# Patient Record
Sex: MALE | Race: WHITE | Employment: FULL TIME | URBAN - METROPOLITAN AREA
[De-identification: names, ages, dates, MRNs, and addresses within clinical notes are randomized per-mention and may not be internally consistent; named-entity substitution may affect disease eponyms.]

---

## 2023-03-10 ENCOUNTER — HOSPITAL ENCOUNTER (EMERGENCY)
Age: 28
Discharge: HOME | End: 2023-03-10
Attending: EMERGENCY MEDICINE

## 2023-03-10 ENCOUNTER — APPOINTMENT (OUTPATIENT)
Dept: GENERAL RADIOLOGY | Age: 28
End: 2023-03-10

## 2023-03-10 VITALS
DIASTOLIC BLOOD PRESSURE: 73 MMHG | TEMPERATURE: 97.5 F | RESPIRATION RATE: 18 BRPM | WEIGHT: 15 LBS | HEART RATE: 55 BPM | OXYGEN SATURATION: 98 % | SYSTOLIC BLOOD PRESSURE: 127 MMHG

## 2023-03-10 DIAGNOSIS — M79.5 FOREIGN BODY (FB) IN SOFT TISSUE: Primary | ICD-10-CM

## 2023-03-10 PROCEDURE — 73552 X-RAY EXAM OF FEMUR 2/>: CPT

## 2023-03-10 PROCEDURE — 90471 IMMUNIZATION ADMIN: CPT | Performed by: STUDENT IN AN ORGANIZED HEALTH CARE EDUCATION/TRAINING PROGRAM

## 2023-03-10 PROCEDURE — 6360000002 HC RX W HCPCS: Performed by: STUDENT IN AN ORGANIZED HEALTH CARE EDUCATION/TRAINING PROGRAM

## 2023-03-10 PROCEDURE — 96372 THER/PROPH/DIAG INJ SC/IM: CPT

## 2023-03-10 PROCEDURE — 6370000000 HC RX 637 (ALT 250 FOR IP): Performed by: STUDENT IN AN ORGANIZED HEALTH CARE EDUCATION/TRAINING PROGRAM

## 2023-03-10 PROCEDURE — 90714 TD VACC NO PRESV 7 YRS+ IM: CPT | Performed by: STUDENT IN AN ORGANIZED HEALTH CARE EDUCATION/TRAINING PROGRAM

## 2023-03-10 PROCEDURE — 99284 EMERGENCY DEPT VISIT MOD MDM: CPT

## 2023-03-10 RX ORDER — LIDOCAINE HYDROCHLORIDE AND EPINEPHRINE 10; 10 MG/ML; UG/ML
20 INJECTION, SOLUTION INFILTRATION; PERINEURAL ONCE
Status: DISCONTINUED | OUTPATIENT
Start: 2023-03-10 | End: 2023-03-10

## 2023-03-10 RX ORDER — AMOXICILLIN AND CLAVULANATE POTASSIUM 875; 125 MG/1; MG/1
1 TABLET, FILM COATED ORAL ONCE
Status: COMPLETED | OUTPATIENT
Start: 2023-03-10 | End: 2023-03-10

## 2023-03-10 RX ORDER — KETOROLAC TROMETHAMINE 30 MG/ML
30 INJECTION, SOLUTION INTRAMUSCULAR; INTRAVENOUS ONCE
Status: COMPLETED | OUTPATIENT
Start: 2023-03-10 | End: 2023-03-10

## 2023-03-10 RX ORDER — AMOXICILLIN AND CLAVULANATE POTASSIUM 875; 125 MG/1; MG/1
1 TABLET, FILM COATED ORAL 2 TIMES DAILY
Qty: 20 TABLET | Refills: 0 | Status: SHIPPED | OUTPATIENT
Start: 2023-03-10 | End: 2023-03-20

## 2023-03-10 RX ADMIN — CLOSTRIDIUM TETANI TOXOID ANTIGEN (FORMALDEHYDE INACTIVATED) AND CORYNEBACTERIUM DIPHTHERIAE TOXOID ANTIGEN (FORMALDEHYDE INACTIVATED) 0.5 ML: 5; 2 INJECTION, SUSPENSION INTRAMUSCULAR at 22:21

## 2023-03-10 RX ADMIN — KETOROLAC TROMETHAMINE 30 MG: 30 INJECTION, SOLUTION INTRAMUSCULAR at 22:22

## 2023-03-10 RX ADMIN — AMOXICILLIN AND CLAVULANATE POTASSIUM 1 TABLET: 875; 125 TABLET ORAL at 23:40

## 2023-03-10 ASSESSMENT — LIFESTYLE VARIABLES: HOW OFTEN DO YOU HAVE A DRINK CONTAINING ALCOHOL: MONTHLY OR LESS

## 2023-03-10 ASSESSMENT — PAIN DESCRIPTION - ORIENTATION
ORIENTATION: RIGHT;UPPER
ORIENTATION: RIGHT

## 2023-03-10 ASSESSMENT — PAIN DESCRIPTION - LOCATION
LOCATION: LEG
LOCATION: LEG

## 2023-03-10 ASSESSMENT — PAIN DESCRIPTION - FREQUENCY: FREQUENCY: INTERMITTENT

## 2023-03-10 ASSESSMENT — PAIN - FUNCTIONAL ASSESSMENT
PAIN_FUNCTIONAL_ASSESSMENT: 0-10
PAIN_FUNCTIONAL_ASSESSMENT: 0-10

## 2023-03-10 ASSESSMENT — PAIN DESCRIPTION - PAIN TYPE: TYPE: ACUTE PAIN

## 2023-03-10 ASSESSMENT — PAIN SCALES - GENERAL
PAINLEVEL_OUTOF10: 3
PAINLEVEL_OUTOF10: 7

## 2023-03-11 NOTE — DISCHARGE INSTRUCTIONS
Take all antibiotics as prescribed  Keep area clean and dry  Follow-up with general surgeon  If you notice any new worrisome symptoms please return to emergency department for evaluation

## 2023-03-11 NOTE — ED PROVIDER NOTES
[address]      Pt Name: Unique Hanson  MRN: [id number]  [place] 1995  Date of evaluation: 3/10/2023  Provider: Louise Marroquin DO  PCP: No primary care provider on file. Note Started: 9:25 PM EST 3/10/23    CHIEF COMPLAINT       Chief Complaint   Patient presents with    Foreign Body in Skin     PC METAL STUCK IN RT UPPER THIGH       HISTORY OF PRESENT ILLNESS: 1 or more Elements   History From: Patient  Limitations to history : None    Unique Hanson is a 33 y.o. male No significant past medical history. Patient presents with chief complaint of right eye pain with concern for possible foreign body. Patient states that he was working with metal earlier today when he was hammering a piece of metal and felt a piece sheared off and embedded in his right thigh. Patient notes pain to his right thigh described as a burning sensation currently rates pain 7 out of 10. Patient denies any exacerbating relieving factors. States that symptoms have been moderate in severity constant since onset. Patient states the pain is worsened with walking he denies any relieving factors. Patient denies any similar episodes in the past.  Patient cannot recall his last tetanus. Denies any nausea, vomiting, chest pain, cough, abdominal pain, constipation or diarrhea. Nursing Notes were all reviewed and agreed with or any disagreements were addressed in the HPI. REVIEW OF SYSTEMS :    Positives and Pertinent negatives as per HPI. SURGICAL HISTORY   No past surgical history on file. [address]       Discharge Medication List as of 3/10/2023 11:37 PM          ALLERGIES     Patient has no known allergies. FAMILYHISTORY     No family history on file.      SOCIAL HISTORY       Social History     Tobacco Use    Smoking status: Some Days     Types: Cigarettes       SCREENINGS        [place] Coma Scale  Eye Opening: Spontaneous  Best Verbal Response: Oriented  Best Motor Response: Obeys commands  [place] Coma Scale Score: 15                CIWA Assessment  BP: 127/73  Heart Rate: 55  Nausea and Vomiting: no nausea and no vomiting           PHYSICAL EXAM  1 or more Elements     ED Triage Vitals   BP Temp Temp Source Heart Rate Resp SpO2 Height Weight   03/10/23 1753 03/10/23 1753 03/10/23 1753 03/10/23 1753 03/10/23 1753 03/10/23 1753 -- 03/10/23 1822   132/87 97.5 °F (36.4 °C) Infrared 63 20 100 %  15 lb (6.804 kg)            Constitutional/General: Alert and oriented x3  Head: Normocephalic and atraumatic  Eyes: PERRL, EOMI, conjunctiva normal, sclera non icteric  ENT:  Oropharynx clear, handling secretions, no trismus, no asymmetry of the posterior oropharynx or uvular edema  Neck: Supple, full ROM, no stridor, no meningeal signs  Respiratory: Lungs clear to auscultation bilaterally, no wheezes, rales, or rhonchi. Not in respiratory distress  Cardiovascular:  Regular rate. Regular rhythm. No murmurs, no gallops, no rubs. 2+ distal pulses. Equal extremity pulses. Chest: No chest wall tenderness  GI:  Abdomen Soft, Non tender, Non distended. +BS. No rebound, guarding, or rigidity. No pulsatile masses. Musculoskeletal: On examination of the right thigh there is a small half centimeter wound noted to the anterior aspect of the right thigh no palpable foreign body identified. Compartment soft compressible right lower extremity is neurovascular intact. Integument: skin warm and dry. No rashes. Neurologic: GCS 15, no focal deficits, symmetric strength 5/5 in the upper and lower extremities bilaterally  Psychiatric: Normal Affect    DIAGNOSTIC RESULTS   LABS:    Labs Reviewed - No data to display    As interpreted by me, the above displayed labs are abnormal. All other labs obtained during this visit were within normal range or not returned as of this dictation.       RADIOLOGY:   Non-plain film images such as CT, Ultrasound and MRI are read by the radiologist. Plain radiographic images are visualized and preliminarily interpreted by the ED Provider with the below findings:  Right femur x-ray: Retained metallic foreign body    Interpretation per the Radiologist below, if available at the time of this note:    XR FEMUR RIGHT (MIN 2 VIEWS)   Final Result   1. A linear crescentic metallic density measuring up to 10 mm is present in   the medial upper thigh soft tissues. 2. No fracture or osseous lesion. RECOMMENDATION:   Careful clinical correlation and follow up recommended. No results found. No results found. PROCEDURES     PROCEDURE  3/10/23       Time: 2310    FOREIGN BODY REMOVAL  Risks, benefits and alternatives (for applicable procedures below) described. Performed By: Terence Galaviz DO. Indication:  Foreign body under the skin. Location:   right thigh . Informed consent obtained: The patient provided verbal consent for this procedure. .  Prep: The skin was cleansed with povidone iodine and draped in a sterile fashion. Anesthetic: The wound area was anesthetized with Lidocaine 1% without epinephrine. Foreign Body was: unable to be removed and a surgical referral was given. Ultrasound Guidance:   used. Complications:  None. Patient tolerated the procedure well. PAST MEDICAL HISTORY/Chronic Conditions Affecting Care    has no past medical history on file.      EMERGENCY DEPARTMENT COURSE    Vitals:    Vitals:    03/10/23 1753 03/10/23 1822 03/10/23 2238   BP: 132/87 129/81 127/73   Pulse: 63  55   Resp: 20 18 18   Temp: 97.5 °F (36.4 °C)     TempSrc: Infrared     SpO2: 100%  98%   Weight:  15 lb (6.804 kg)        Patient was given the following medications:  Medications   tetanus & diphtheria toxoids (adult) 5-2 LFU injection 0.5 mL (0.5 mLs IntraMUSCular Given 3/10/23 2221)   ketorolac (TORADOL) injection 30 mg (30 mg IntraMUSCular Given 3/10/23 2222)   amoxicillin-clavulanate (AUGMENTIN) 875-125 MG per tablet 1 tablet (1 tablet Oral Given 3/10/23 5850)         Is this patient to be included in the SEP-1 Core Measure due to severe sepsis or septic shock? No Exclusion criteria - the patient is NOT to be included for SEP-1 Core Measure due to: Infection is not suspected      Medical Decision Making/Differential Diagnosis:    CC/HPI Summary, Social Determinants of health, Records Reviewed, DDx, testing done/not done, ED Course, Reassessment, disposition considerations/shared decision making with patient, consults, disposition:            Chronic Conditions: No past medical history on file. CONSULTS: (Who and What was discussed)  None    Discussion with Other Profesionals : None    Social Determinants : None    Records Reviewed : None    CC/HPI Summary, DDx, ED Course, and Reassessment:   Patient is a 50-year-old male no significant past medical history. Patient presents with chief complaint of foreign body in right thigh. Vital signs stable presentation. On physical exam heart regular rate and rhythm, lungs clear to auscultation bilaterally, abdomen soft nontender no rebound or guarding. On examination of the right thigh there is a small half centimeter wound noted to the anterior right thigh no palpable foreign body identified. Differential diagnosis includes laceration, metallic foreign body. Right femur x-rays obtained demonstrated a linear crescent metallic density approximately 10 mm in the medial upper thigh. Patient tetanus shot updated. Patient given 30 mg of IM Toradol for pain. Patient did give verbal consent for attempt at foreign body removal.  Please see above corresponding procedure note. Even with ultrasound guidance was unable to definitively identify foreign body for removal.  Decision made to leave foreign body in place and refer patient for general surgery evaluation. Patient was also covered with Augmentin 875-125 mg for infection prevention.   Finds consistent with metallic foreign body. Patient is overall well-appearing his tetanus shot was updated patient did tolerate oral antibiotics well. Decision made to discharge patient. Patient given prescription for Augmentin as well as general surgery referral.  Patient also instructed to follow-up with primary care doctor. If patient notes any new worrisome symptoms he was instructed to return to the emergency department for further evaluation. Plan of care discussed with patient including discharge all questions were answered, patient was in agreement plan of care and discharged home in stable condition. Disposition Considerations (Tests not ordered but considered, Shared Decision Making, Pt Expectation of Test or Tx.): Shared decision making discussed with patient. At this time we were unable to remove foreign body even under ultrasound guidance. Patient will be covered with antibiotics as well as given referral to general surgery for further evaluation. FINAL IMPRESSION      1.  Foreign body (FB) in soft tissue          DISPOSITION/PLAN     DISPOSITION Decision To Discharge 03/10/2023 11:24:22 PM    PATIENT REFERRED TO:  [facility]) Physicians Pre-Service  [phone]  Call   for PCP referral    [address] [facility] Emergency Department  [address] [address] [phone]  Go to   If symptoms worsen    Ronaldo Ramos MD  [facility] [address]  [address] [facility]  [address] [address] [phone]          DISCHARGE MEDICATIONS:  Discharge Medication List as of 3/10/2023 11:37 PM        START taking these medications    Details   amoxicillin-clavulanate (AUGMENTIN) 875-125 MG per tablet Take 1 tablet by mouth 2 times daily for 10 days, Disp-20 tablet, R-0Normal             DISCONTINUED MEDICATIONS:  Discharge Medication List as of 3/10/2023 11:37 PM               (Please note that portions of this note were completed with a voice recognition program.  Efforts were made to edit the dictations but occasionally words are mis-transcribed.)    Shiraz Delarosa DO (electronically signed)        Brenda Gonzalez DO  Resident  03/11/23 8417

## 2023-03-15 ENCOUNTER — INITIAL CONSULT (OUTPATIENT)
Dept: SURGERY | Age: 28
End: 2023-03-15

## 2023-03-15 VITALS
HEART RATE: 74 BPM | HEIGHT: 66 IN | BODY MASS INDEX: 24.11 KG/M2 | TEMPERATURE: 98.1 F | SYSTOLIC BLOOD PRESSURE: 131 MMHG | WEIGHT: 150 LBS | DIASTOLIC BLOOD PRESSURE: 73 MMHG

## 2023-03-15 DIAGNOSIS — S71.122A: Primary | ICD-10-CM

## 2023-03-15 PROCEDURE — 99203 OFFICE O/P NEW LOW 30 MIN: CPT | Performed by: SURGERY

## 2023-03-17 PROBLEM — S71.122A: Status: ACTIVE | Noted: 2023-03-17

## 2023-03-17 NOTE — PROGRESS NOTES
General Surgery History and Physical  [facility]    Patient's Name/Date of Birth: Magali Coronado / 1995    Date: March 17, 2023     Surgeon: Vince Vasquez MD    PCP: No primary care provider on file. Chief Complaint: Right thigh foreign body    HPI:   Magali Coronado is a 33 y.o. male who presents for evaluation of right thigh foreign body. He was working on metal equipment at home and a piece flew off and hit him in the thigh. He was seen in urgent care and was started on antibiotics. He has minimal pain at the site. There has been no fevers, chills, warmth, erythema, drainage, other signs of infection. He was referred for surgical evaluation. There is no problem list on file for this patient. Past Medical History:   Diagnosis Date    Heart murmur        No past surgical history on file. No Known Allergies    The patient has a family history that is negative for severe cardiovascular or respiratory issues, negative for reaction to anesthesia. Time spent reviewing past medical, surgical, social and family history, vitals, nursing assessment and images. No changes from above documented history.     Social History     Socioeconomic History    Marital status:      Spouse name: Not on file    Number of children: Not on file    Years of education: Not on file    Highest education level: Not on file   Occupational History    Not on file   Tobacco Use    Smoking status: Never    Smokeless tobacco: Not on file   Vaping Use    Vaping Use: Not on file   Substance and Sexual Activity    Alcohol use: Not on file    Drug use: Not on file    Sexual activity: Not on file   Other Topics Concern    Not on file   Social History Narrative    Not on file     Social Determinants of Health     Financial Resource Strain: Not on file   Food Insecurity: Not on file   Transportation Needs: Not on file   Physical Activity: Not on file   Stress: Not on file   Social Connections: Not on file   Intimate Partner Violence: Not on file   Housing Stability: Not on file       I have reviewed relevant labs from this admission and interpretation is included in my assessment and plan    Review of Systems    A complete 10 system review was performed and are otherwise negative unless mentioned in the above HPI. Specific negatives are listed below but may not include all those reviewed. General ROS: negative obtundation, AMS  ENT ROS: negative rhinorrhea, epistaxis  Allergy and Immunology ROS: negative itchy/watery eyes or nasal congestion  Hematological and Lymphatic ROS: negative spontaneous bleeding or bruising  Endocrine ROS: negative  lethargy, mood swings, palpitations or polydipsia/polyuria  Respiratory ROS: negative sputum changes, stridor, tachypnea or wheezing  Cardiovascular ROS: negative for - loss of consciousness, murmur or orthopnea  Gastrointestinal ROS: negative for - hematochezia or hematemesis  Genito-Urinary ROS: negative for -  genital discharge or hematuria  Musculoskeletal ROS: negative for - focal weakness, gangrene  Psych/Neuro ROS: negative for - visual or auditory hallucinations, suicidal ideation    Physical exam:   /73   Pulse 74   Temp 98.1 °F (36.7 °C)   Ht 5' 6\" (1.676 m)   Wt 150 lb (68 kg)   BMI 24.21 kg/m²   General appearance:  NAD, appears stated age  Head: NCAT, PERRLA, EOMI, red conjunctiva  Neck: supple, no masses, trachea midline  Lungs: Equal chest rise bilateral, no retractions, no wheezing  Heart: Reg rate  Abdomen: soft, nontender, nondistended  Skin; warm and dry, no cyanosis  Gu: no cva tenderness  Extremities: Right proximal anterior thigh with 5 mm laceration that is healing with a scab. There is no erythema. Small area of swelling believe the laceration without palpable metallic fragment.   Psych: No tremor, visual hallucinations      Radiology: I reviewed relevant abdominal imaging from this admission and that available in the EMR including x-ray of the right femur showing metallic fragment in the soft tissues    Assessment:  Magali Coronado is a 33 y.o. male with metal foreign body proximal left thigh  There is no problem list on file for this patient. Plan:  Patient has no pain at this time. He has minimal signs of infection and is currently on antibiotics  We discussed surgical intervention versus watchful waiting. We have decided to monitor this. Patient will follow-up in 4 weeks to determine if surgical intervention is warranted at this time  He was instructed to return sooner if there is any signs of infection, pain, swelling.         Vince Vasquez MD

## 2023-04-26 ENCOUNTER — OFFICE VISIT (OUTPATIENT)
Dept: SURGERY | Age: 28
End: 2023-04-26

## 2023-04-26 VITALS
WEIGHT: 150 LBS | HEART RATE: 64 BPM | SYSTOLIC BLOOD PRESSURE: 127 MMHG | HEIGHT: 66 IN | BODY MASS INDEX: 24.11 KG/M2 | TEMPERATURE: 98.9 F | RESPIRATION RATE: 18 BRPM | DIASTOLIC BLOOD PRESSURE: 80 MMHG

## 2023-04-26 DIAGNOSIS — S71.122D: Primary | ICD-10-CM

## 2023-04-26 PROCEDURE — 99213 OFFICE O/P EST LOW 20 MIN: CPT | Performed by: SURGERY

## 2023-04-26 NOTE — PROGRESS NOTES
General Surgery Office Note  [facility]  Consandre P. Julian Timmons MD    Patient's Name/Date of Birth: Vee Mcneill / 1995    Date: April 26, 2023     Surgeon: Julian Timmons MD    Chief Complaint:   Chief Complaint   Patient presents with    Follow-up     4 week follow up/eval if surgery is needed       Patient Active Problem List   Diagnosis    Laceration of left thigh with foreign body       Subjective: doing well. No issues since last visit. No pain. Objective:  /80   Pulse 64   Temp 98.9 °F (37.2 °C) (Temporal)   Resp 18   Ht 5' 6\" (1.676 m)   Wt 150 lb (68 kg)   BMI 24.21 kg/m²   Labs:  No results for input(s): WBC, HGB, HCT in the last 72 hours. Invalid input(s): PLR  No results found for: CREATININE, BUN, NA, K, CL, CO2  No results for input(s): LIPASE, AMYLASE in the last 72 hours. General appearance: AA, NAD  HEENT: NCAT, PERRLA, EOMI  Lungs: Clear, equal rise bilateral  Heart: Reg  Abdomen: soft, nondistended, nontender  Skin: R medial thigh small area of induration. No erythema. No signs of infection. Psych: No distress, conversive, no hallucinations  : No ulcers or lesions  Rectal: No bleeding    A complete 10 system review was performed and are otherwise negative unless mentioned in the above HPI. Specific negatives are listed below but may not include all those reviewed.     General ROS: negative obtundation, AMS  ENT ROS: negative rhinorrhea, epistaxis  Allergy and Immunology ROS: negative itchy/watery eyes or nasal congestion  Hematological and Lymphatic ROS: negative spontaneous bleeding or bruising  Endocrine ROS: negative  lethargy, mood swings, palpitations or polydipsia/polyuria  Respiratory ROS: negative sputum changes, stridor, tachypnea or wheezing  Cardiovascular ROS: negative for - loss of consciousness, murmur or orthopnea  Gastrointestinal ROS: negative for - hematochezia or hematemesis  Genito-Urinary ROS: negative for -  genital discharge or

## 2025-04-26 ENCOUNTER — HOSPITAL ENCOUNTER (EMERGENCY)
Age: 30
Discharge: HOME | End: 2025-04-26
Payer: COMMERCIAL

## 2025-04-26 VITALS
HEART RATE: 54 BPM | OXYGEN SATURATION: 100 % | DIASTOLIC BLOOD PRESSURE: 73 MMHG | RESPIRATION RATE: 16 BRPM | SYSTOLIC BLOOD PRESSURE: 129 MMHG | TEMPERATURE: 97.8 F

## 2025-04-26 DIAGNOSIS — T15.02XA FOREIGN BODY OF LEFT CORNEA, INITIAL ENCOUNTER: Primary | ICD-10-CM

## 2025-04-26 PROCEDURE — 6370000000 HC RX 637 (ALT 250 FOR IP): Performed by: NURSE PRACTITIONER

## 2025-04-26 PROCEDURE — 99283 EMERGENCY DEPT VISIT LOW MDM: CPT

## 2025-04-26 RX ORDER — TETRACAINE HYDROCHLORIDE 5 MG/ML
2 SOLUTION OPHTHALMIC ONCE
Status: COMPLETED | OUTPATIENT
Start: 2025-04-26 | End: 2025-04-26

## 2025-04-26 RX ORDER — ERYTHROMYCIN 5 MG/G
OINTMENT OPHTHALMIC
Qty: 1 G | Refills: 0 | Status: SHIPPED | OUTPATIENT
Start: 2025-04-26 | End: 2025-05-06

## 2025-04-26 RX ADMIN — TETRACAINE HYDROCHLORIDE 2 DROP: 5 SOLUTION OPHTHALMIC at 16:38

## 2025-04-26 RX ADMIN — FLUORESCEIN SODIUM 1 MG: 1 STRIP OPHTHALMIC at 16:38

## 2025-04-26 ASSESSMENT — VISUAL ACUITY
OS: 20/20
OU: 20/20
OD: 20/20

## 2025-04-26 NOTE — DISCHARGE INSTRUCTIONS
Unfortunately this will need to be taken out by an ophthalmologist.  Please call the ophthalmologist office on Monday morning.  In the meantime erythromycin eye ointment every 6 hours while awake.  Try not to rub your eye.